# Patient Record
Sex: FEMALE | Race: WHITE | URBAN - METROPOLITAN AREA
[De-identification: names, ages, dates, MRNs, and addresses within clinical notes are randomized per-mention and may not be internally consistent; named-entity substitution may affect disease eponyms.]

---

## 2021-04-30 ENCOUNTER — HOSPITAL ENCOUNTER (INPATIENT)
Facility: CLINIC | Age: 21
LOS: 4 days | Discharge: HOME | DRG: 885 | End: 2021-05-04
Attending: PSYCHIATRY & NEUROLOGY | Admitting: PSYCHIATRY & NEUROLOGY
Payer: COMMERCIAL

## 2021-04-30 ENCOUNTER — TELEPHONE (OUTPATIENT)
Dept: BEHAVIORAL HEALTH | Facility: CLINIC | Age: 21
End: 2021-04-30

## 2021-04-30 ENCOUNTER — TRANSFERRED RECORDS (OUTPATIENT)
Dept: HEALTH INFORMATION MANAGEMENT | Facility: CLINIC | Age: 21
End: 2021-04-30

## 2021-04-30 DIAGNOSIS — F43.10 PTSD (POST-TRAUMATIC STRESS DISORDER): ICD-10-CM

## 2021-04-30 DIAGNOSIS — F32.2 MAJOR DEPRESSIVE DISORDER, SINGLE EPISODE, SEVERE WITHOUT PSYCHOSIS (H): Primary | ICD-10-CM

## 2021-04-30 DIAGNOSIS — F41.9 ANXIETY: ICD-10-CM

## 2021-04-30 PROBLEM — R45.851 SUICIDAL IDEATION: Status: ACTIVE | Noted: 2021-04-30

## 2021-04-30 PROCEDURE — 124N000002 HC R&B MH UMMC

## 2021-04-30 PROCEDURE — 99223 1ST HOSP IP/OBS HIGH 75: CPT | Mod: AI | Performed by: NURSE PRACTITIONER

## 2021-04-30 PROCEDURE — 99207 PR CONSULT E&M CHANGED TO SUBSEQUENT LEVEL: CPT | Performed by: NURSE PRACTITIONER

## 2021-04-30 PROCEDURE — 250N000013 HC RX MED GY IP 250 OP 250 PS 637: Performed by: NURSE PRACTITIONER

## 2021-04-30 PROCEDURE — 99232 SBSQ HOSP IP/OBS MODERATE 35: CPT | Performed by: NURSE PRACTITIONER

## 2021-04-30 RX ORDER — AMOXICILLIN 250 MG
1 CAPSULE ORAL 2 TIMES DAILY PRN
Status: DISCONTINUED | OUTPATIENT
Start: 2021-04-30 | End: 2021-05-04 | Stop reason: HOSPADM

## 2021-04-30 RX ORDER — PRAZOSIN HYDROCHLORIDE 1 MG/1
1 CAPSULE ORAL AT BEDTIME
Status: DISCONTINUED | OUTPATIENT
Start: 2021-04-30 | End: 2021-05-04 | Stop reason: HOSPADM

## 2021-04-30 RX ORDER — OLANZAPINE 5 MG/1
5-10 TABLET ORAL 3 TIMES DAILY PRN
Status: DISCONTINUED | OUTPATIENT
Start: 2021-04-30 | End: 2021-05-03

## 2021-04-30 RX ORDER — HYDROXYZINE HYDROCHLORIDE 25 MG/1
25-50 TABLET, FILM COATED ORAL EVERY 4 HOURS PRN
Status: DISCONTINUED | OUTPATIENT
Start: 2021-04-30 | End: 2021-05-04 | Stop reason: HOSPADM

## 2021-04-30 RX ORDER — TRAZODONE HYDROCHLORIDE 50 MG/1
50 TABLET, FILM COATED ORAL
Status: DISCONTINUED | OUTPATIENT
Start: 2021-04-30 | End: 2021-05-04 | Stop reason: HOSPADM

## 2021-04-30 RX ORDER — FOLIC ACID 1 MG/1
1 TABLET ORAL DAILY
Status: DISCONTINUED | OUTPATIENT
Start: 2021-05-01 | End: 2021-04-30

## 2021-04-30 RX ORDER — GABAPENTIN 100 MG/1
100 CAPSULE ORAL 3 TIMES DAILY
Status: DISCONTINUED | OUTPATIENT
Start: 2021-04-30 | End: 2021-05-04 | Stop reason: HOSPADM

## 2021-04-30 RX ORDER — LANOLIN ALCOHOL/MO/W.PET/CERES
100 CREAM (GRAM) TOPICAL DAILY
Status: DISCONTINUED | OUTPATIENT
Start: 2021-05-01 | End: 2021-04-30

## 2021-04-30 RX ORDER — MAGNESIUM HYDROXIDE/ALUMINUM HYDROXICE/SIMETHICONE 120; 1200; 1200 MG/30ML; MG/30ML; MG/30ML
30 SUSPENSION ORAL EVERY 4 HOURS PRN
Status: DISCONTINUED | OUTPATIENT
Start: 2021-04-30 | End: 2021-05-04 | Stop reason: HOSPADM

## 2021-04-30 RX ORDER — GABAPENTIN 100 MG/1
100-300 CAPSULE ORAL 3 TIMES DAILY PRN
Status: DISCONTINUED | OUTPATIENT
Start: 2021-04-30 | End: 2021-05-04 | Stop reason: HOSPADM

## 2021-04-30 RX ORDER — ATENOLOL 50 MG/1
50 TABLET ORAL DAILY PRN
Status: DISCONTINUED | OUTPATIENT
Start: 2021-04-30 | End: 2021-04-30

## 2021-04-30 RX ORDER — FLUOXETINE 10 MG/1
10 CAPSULE ORAL DAILY
Status: DISCONTINUED | OUTPATIENT
Start: 2021-04-30 | End: 2021-05-04 | Stop reason: HOSPADM

## 2021-04-30 RX ORDER — DIAZEPAM 5 MG
5-20 TABLET ORAL EVERY 30 MIN PRN
Status: DISCONTINUED | OUTPATIENT
Start: 2021-04-30 | End: 2021-04-30

## 2021-04-30 RX ORDER — ACETAMINOPHEN 325 MG/1
650 TABLET ORAL EVERY 4 HOURS PRN
Status: DISCONTINUED | OUTPATIENT
Start: 2021-04-30 | End: 2021-05-04 | Stop reason: HOSPADM

## 2021-04-30 RX ORDER — OLANZAPINE 10 MG/2ML
5-10 INJECTION, POWDER, FOR SOLUTION INTRAMUSCULAR 3 TIMES DAILY PRN
Status: DISCONTINUED | OUTPATIENT
Start: 2021-04-30 | End: 2021-05-03

## 2021-04-30 RX ORDER — MULTIPLE VITAMINS W/ MINERALS TAB 9MG-400MCG
1 TAB ORAL DAILY
Status: DISCONTINUED | OUTPATIENT
Start: 2021-05-01 | End: 2021-04-30

## 2021-04-30 RX ADMIN — FLUOXETINE 10 MG: 10 CAPSULE ORAL at 14:31

## 2021-04-30 RX ADMIN — GABAPENTIN 100 MG: 100 CAPSULE ORAL at 22:26

## 2021-04-30 RX ADMIN — PRAZOSIN HYDROCHLORIDE 1 MG: 1 CAPSULE ORAL at 22:26

## 2021-04-30 RX ADMIN — GABAPENTIN 100 MG: 100 CAPSULE ORAL at 14:31

## 2021-04-30 ASSESSMENT — ACTIVITIES OF DAILY LIVING (ADL)
FALL_HISTORY_WITHIN_LAST_SIX_MONTHS: NO
DIFFICULTY_COMMUNICATING: NO
DOING_ERRANDS_INDEPENDENTLY_DIFFICULTY: NO
CONCENTRATING,_REMEMBERING_OR_MAKING_DECISIONS_DIFFICULTY: NO
WALKING_OR_CLIMBING_STAIRS_DIFFICULTY: NO
DIFFICULTY_COMMUNICATING: NO
WEAR_GLASSES_OR_BLIND: NO
PATIENT_/_FAMILY_COMMUNICATION_STYLE: SPOKEN LANGUAGE (ENGLISH OR BILINGUAL)
HEARING_DIFFICULTY_OR_DEAF: NO
CONCENTRATING,_REMEMBERING_OR_MAKING_DECISIONS_DIFFICULTY: NO
HEARING_DIFFICULTY_OR_DEAF: NO
PATIENT_/_FAMILY_COMMUNICATION_STYLE: SPOKEN LANGUAGE (ENGLISH OR BILINGUAL)
WEAR_GLASSES_OR_BLIND: NO
WALKING_OR_CLIMBING_STAIRS_DIFFICULTY: NO
TOILETING_ISSUES: NO
DOING_ERRANDS_INDEPENDENTLY_DIFFICULTY: NO
DRESSING/BATHING_DIFFICULTY: NO
TOILETING_ISSUES: NO
FALL_HISTORY_WITHIN_LAST_SIX_MONTHS: NO
DRESSING/BATHING_DIFFICULTY: NO
DIFFICULTY_EATING/SWALLOWING: NO
DIFFICULTY_EATING/SWALLOWING: NO

## 2021-04-30 ASSESSMENT — MIFFLIN-ST. JEOR: SCORE: 1407.24
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
## 2021-04-30 NOTE — TELEPHONE ENCOUNTER
R:    Patient cleared and ready for behavioral bed placement: Yes     Provider called back at  7:13am and accepted for 4A  Pt placed in unit queue at 7:22am and unit called with disposition  [facility] Updated with Placement, Accepting physician and unit to call for nurse to nurse

## 2021-04-30 NOTE — CONSULTS
Internal Medicine Consult - Initial Visit       Munira Mazariegos MRN# [id number]   YOB: 2000 Age: 21 year old   Date of Admission: 4/30/2021  PCP: No primary care provider on file.  Date of Service: 4/30/2021    Referring Provider: Erick Dunlap MD  Reason for Consult: Medical co-management          Assessment and Recommendations:   Munira Mazariegos is a 21 year old female with a history of depression, anxiety, and polysubstance abuse admitted for worsening depression and SI.          # Depression, anxiety - Admitted w/ SI and severe depression.     - Management per Psychiatry     # Polysubstance abuse - In remission. Hx of benzo misuse per chart review.  - Continue nicotine replacement PRN     Medicine will sign off, no further recommendations at this time.  Please feel free to reconsult if patient's symptoms worsen or if new problems arise.  Thank you for the opportunity to care for this patient.     Jo Quarles, CNP, APRN  Internal Medicine [place] Hospitalist  [facility]  Pager [phone]           History of Present Illness:   History is obtained from the patient and medical record.     This patient is a 21 year old female with a history of depression, anxiety, and polysubstance abuse admitted for worsening depression and SI.        Internal Medicine service was asked to see patient for medical co-management and admission from OSH.  Munira is resting in bed.  She appears tired and wants to sleep.  Denies any chronic medical issues.  She denies chest pain, dyspnea, abdominal pain, fevers, chills, and dysuria.             Review of Systems:   A 10 point ROS was performed and negative unless otherwise noted in HPI.           Past Medical History:   Reviewed and updated in Epic.  Past Medical History:   Diagnosis Date     Anxiety      Depression              Past Surgical History:   Reviewed and updated in Epic.  No past surgical history on file.          Social History:    Reviewed and updated in Epic.  Social History     Socioeconomic History     Marital status: Single     Spouse name: Not on file     Number of children: Not on file     Years of education: Not on file     Highest education level: Not on file   Occupational History     Not on file   Social Needs     Financial resource strain: Not on file     Food insecurity     Worry: Not on file     Inability: Not on file     Transportation needs     Medical: Not on file     Non-medical: Not on file   Tobacco Use     Smoking status: Not on file   Substance and Sexual Activity     Alcohol use: Not on file     Drug use: Not on file     Sexual activity: Not on file   Lifestyle     Physical activity     Days per week: Not on file     Minutes per session: Not on file     Stress: Not on file   Relationships     Social connections     Talks on phone: Not on file     Gets together: Not on file     Attends Christianity service: Not on file     Active member of club or organization: Not on file     Attends meetings of clubs or organizations: Not on file     Relationship status: Not on file     Intimate partner violence     Fear of current or ex partner: Not on file     Emotionally abused: Not on file     Physically abused: Not on file     Forced sexual activity: Not on file   Other Topics Concern     Not on file   Social History Narrative     Not on file              Family History:   Reviewed and updated in Epic.  No family history on file.          Allergies:   No Known Allergies          Medications:     Current Facility-Administered Medications   Medication     acetaminophen (TYLENOL) tablet 650 mg     alum & mag hydroxide-simethicone (MAALOX) suspension 30 mL     [START ON 5/1/2021] ARIPiprazole (ABILIFY) half-tab 2 mg     FLUoxetine (PROzac) capsule 10 mg     gabapentin (NEURONTIN) capsule 100 mg     gabapentin (NEURONTIN) capsule 100-300 mg     hydrOXYzine (ATARAX) tablet 25-50 mg     nicotine (NICORETTE) gum 2-4 mg     OLANZapine  "(zyPREXA) tablet 5-10 mg    Or     OLANZapine (zyPREXA) injection 5-10 mg     prazosin (MINIPRESS) capsule 1 mg     senna-docusate (SENOKOT-S/PERICOLACE) 8.6-50 MG per tablet 1 tablet     traZODone (DESYREL) tablet 50 mg            Physical Exam:   Blood pressure 116/80, pulse 106, temperature 98  F (36.7  C), temperature source Oral, resp. rate 16, height 1.676 m (5' 6\"), weight 63.5 kg (140 lb 1.6 oz), SpO2 98 %.  Body mass index is 22.61 kg/m .    GENERAL: Alert and oriented x 3. Well nourished, well developed.  No acute distress.    HEENT: Normocephalic, atraumatic. Anicteric sclera. Mucous membranes moist.   CV: RRR. S1, S2. No murmurs appreciated.   RESPIRATORY: Effort normal on room air. Lungs CTAB with no wheezing, rales, or rhonchi.   GI: Abdomen soft and non distended, bowel sounds present x all 4 quadrants. No tenderness, rebound, or guarding.   NEUROLOGICAL: No focal deficits. Follows commands.  Strength equal in upper and lower extremities.   MUSCULOSKELETAL: No joint swelling or tenderness. Moves all extremities.   EXTREMITIES: No gross deformities. No peripheral edema.  SKIN: Grossly warm, dry, and intact. No jaundice. No rashes.             Data:   I personally reviewed the following studies:    ROUTINE IP LABS (Last four results)  CMP No lab results found in last 7 days.  CBC No lab results found in last 7 days.  INR No lab results found in last 7 days.      Unresulted Labs Ordered in the Past 30 Days of this Admission     No orders found from 3/31/2021 to 5/1/2021.             "
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
## 2021-04-30 NOTE — PROGRESS NOTES
04/30/21 1131   Patient Belongings   Did you bring any home meds/supplements to the hospital?  No   Patient Belongings locker   Patient Belongings Put in Hospital Secure Location (Security or Locker, etc.) other (see comments)   Belongings Search Yes   Clothing Search Yes   Second Staff Nata ZAMORA & Kaoyde PERALES       Unit Bin  - 2 t -shirts  - 1 grey zip up sweathshirt  - 1 dark grey sweatpants  - 1 black soft shell jacket  - 1 black scrunchie   - 1 iphone with case  - 1 iphone  cable with outlet cube  - 4 deb pins  - 1 apple watch  - 1 pack of cigaretters  - 1 green lighter  - 1 2 fl oz bottle of contact solution  - 1 contact lens case  - 1 pair of shoes    With Patient  - 1 Bra  - 1 pair of Underwear    Nothing in Security    A               Admission:  I am responsible for any personal items that are not sent to the safe or pharmacy.  [facility] is not responsible for loss, theft or damage of any property in my possession.    Signature:  _________________________________ Date: _______  Time: _____                                              Staff Signature:  ____________________________ Date: ________  Time: _____      2nd Staff person, if patient is unable/unwilling to sign:    Signature: ________________________________ Date: ________  Time: _____     Discharge:  [facility] has returned all of my personal belongings:    Signature: _________________________________ Date: ________  Time: _____                                          Staff Signature:  ____________________________ Date: ________  Time: _____

## 2021-04-30 NOTE — PLAN OF CARE
"ADMIT:              S: This is a 20 y/o female presented to the [facility] ED with depression and SI & transported from there ED to our unit 4a arriving at 1100 today.     B: pt has Hx MDD, PTSD, anxiety, insomnia, polysubstance abuse.  SI w/a plan to drive in front of a semi truck and thoughts of overdosing on all of her medications.  Stopped taking her medications 6 months ago because she felt she was doing well.  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 Pt admitted to drinking alcohol last night which was a .2. Hx of suicide attempt via overdose at 17 yo.  She lives alone.   PROBATION: She is on probation for the past year due to a DWI.     A: 72 hr hold 0545 this morning.  No acute medical concerns. Negative for COVID.  Drug screen negative.  Ethanol at 0245 was 202  CBC within normal range  Protein total 8.3  Acetaminophen 0  Salicylate 2.1     R: she has contracted for safety and has settled in well on the unit at this time.                "

## 2021-04-30 NOTE — TELEPHONE ENCOUNTER
S: 20 y/o female presented to the [facility] ED with depression and SI.    B: Hx MDD, PTSD, anxiety, insomnia, polysubstance abuse.  SI w/a plan to drive in front of a semi truck and thoughts of overdosing on all of her medications.  Stopped taking her medications 6 months ago because she felt she was doing well.  Multiple stressors: death of a friend and stressful work environment as a CNA.  Pt admitted to drinking alcohol last night.  Hx of suicide attempt via overdose.    A: 72 hr hold  No acute medical concerns.  Negative for COVID.  Drug screen negative.  Ethanol at 0245 202  CBC within normal range  Protein total 8.3  Acetaminophen 0  Salicylate 2.1    R: 0703 On-Call paged.  1246 Case has been passed to days to track.

## 2021-05-01 PROCEDURE — 250N000013 HC RX MED GY IP 250 OP 250 PS 637: Performed by: NURSE PRACTITIONER

## 2021-05-01 PROCEDURE — 124N000002 HC R&B MH UMMC

## 2021-05-01 RX ADMIN — GABAPENTIN 100 MG: 100 CAPSULE ORAL at 21:45

## 2021-05-01 RX ADMIN — GABAPENTIN 100 MG: 100 CAPSULE ORAL at 09:27

## 2021-05-01 RX ADMIN — HYDROXYZINE HYDROCHLORIDE 50 MG: 25 TABLET, FILM COATED ORAL at 00:21

## 2021-05-01 RX ADMIN — FLUOXETINE 10 MG: 10 CAPSULE ORAL at 09:27

## 2021-05-01 RX ADMIN — Medication 2 MG: at 09:27

## 2021-05-01 RX ADMIN — GABAPENTIN 100 MG: 100 CAPSULE ORAL at 14:49

## 2021-05-01 RX ADMIN — PRAZOSIN HYDROCHLORIDE 1 MG: 1 CAPSULE ORAL at 21:46

## 2021-05-01 NOTE — PLAN OF CARE
Pt isolated and slept the whole evening. She was medication compliant and makes her needs known. Pt did eat supper and went back to sleep. Pt did not want to wake up to answer any questions. Will continue to monitor.

## 2021-05-01 NOTE — PHARMACY-ADMISSION MEDICATION HISTORY
Admission Medication History status for the 4/30/2021 admission is complete.  See EPIC admission navigator for Prior to Admission medications.    Medication history sources:  patient and superscripts      Medication history source reliability: Good    Medication adherence:  N/A    Changes made to PTA medication list (reason)  Added: N/A  Deleted: N/A  Changed: N/A    Additional medication history information (including reliability of information, actions taken by pharmacist): None    Time spent in this activity: 10 minutes     Medication history completed by: Naa Mccullough, Pharm.D    Prior to Admission medications    Not on File

## 2021-05-01 NOTE — PROGRESS NOTES
"0020--Patient c/o difficulty sleeping. Writer offered Trazodone and patient decline ans said \"I cant do Trazodone.\" Patient offered and received hydroxyzine 50 mg at this time. Patient returned to room and slept through the rest of the night.   "

## 2021-05-01 NOTE — PLAN OF CARE
Problem: Depressive Symptoms  Goal: Social and Therapeutic (Depression)  Description: Signs and symptoms of listed problems will be absent or manageable.  Outcome: Improving   Pt has to awoken as she presents as tired. She ate breakfast and cooperative with meds. She remains sad in affect but has contracted for safety. Will attempt to engage in conversation.

## 2021-05-02 PROCEDURE — 124N000002 HC R&B MH UMMC

## 2021-05-02 PROCEDURE — 250N000013 HC RX MED GY IP 250 OP 250 PS 637: Performed by: NURSE PRACTITIONER

## 2021-05-02 RX ADMIN — Medication 2 MG: at 08:50

## 2021-05-02 RX ADMIN — PRAZOSIN HYDROCHLORIDE 1 MG: 1 CAPSULE ORAL at 21:07

## 2021-05-02 RX ADMIN — HYDROXYZINE HYDROCHLORIDE 50 MG: 25 TABLET, FILM COATED ORAL at 22:58

## 2021-05-02 RX ADMIN — GABAPENTIN 100 MG: 100 CAPSULE ORAL at 21:07

## 2021-05-02 RX ADMIN — FLUOXETINE 10 MG: 10 CAPSULE ORAL at 08:50

## 2021-05-02 RX ADMIN — GABAPENTIN 100 MG: 100 CAPSULE ORAL at 14:24

## 2021-05-02 RX ADMIN — GABAPENTIN 100 MG: 100 CAPSULE ORAL at 08:50

## 2021-05-02 ASSESSMENT — MIFFLIN-ST. JEOR: SCORE: 1398.62

## 2021-05-02 ASSESSMENT — ACTIVITIES OF DAILY LIVING (ADL)
HYGIENE/GROOMING: HANDWASHING;INDEPENDENT;SHOWER
LAUNDRY: WITH SUPERVISION
DRESS: SCRUBS (BEHAVIORAL HEALTH)
ORAL_HYGIENE: INDEPENDENT

## 2021-05-02 NOTE — PLAN OF CARE
Problem: Depressive Symptoms  Goal: Depressive Symptoms  Description: Signs and symptoms of listed problems will be absent or manageable.  Outcome: No Change   Pt endorses depression but denies SI. She is flat in affect but states she is doing OK. She sleeps in but does eat breakfast and just needs to discuss more of her issues that would apply to her situation. She agreed to open up.  Will continue to assess.

## 2021-05-02 NOTE — PROGRESS NOTES
Patient was awake once to get water and returned to bed. Patient slept through the rest of the night (7 hrs). No safety concerns at this time.

## 2021-05-02 NOTE — PLAN OF CARE
Problem: Depressive Symptoms  Goal: Depressive Symptoms  Description: Signs and symptoms of listed problems will be absent or manageable.  Outcome: Declining   48 Hour Nursing Assessment     Patient evaluation continues. Is agreeable to check in.  Patient stays isolated to her room. Assessed mood, anxiety, thoughts and behavior. Patient denies SI, SIB and auditory or visual hallucinations.   Pt reports staying in her room to read a book and nap.   Reports good sleep and good appetite.     Will continue to encourage participation in groups and developing healthy coping skills. Will continue to assess.

## 2021-05-03 PROCEDURE — 99232 SBSQ HOSP IP/OBS MODERATE 35: CPT | Performed by: CLINICAL NURSE SPECIALIST

## 2021-05-03 PROCEDURE — 124N000002 HC R&B MH UMMC

## 2021-05-03 PROCEDURE — 250N000013 HC RX MED GY IP 250 OP 250 PS 637: Performed by: NURSE PRACTITIONER

## 2021-05-03 PROCEDURE — 90853 GROUP PSYCHOTHERAPY: CPT

## 2021-05-03 PROCEDURE — G0177 OPPS/PHP; TRAIN & EDUC SERV: HCPCS

## 2021-05-03 RX ORDER — GABAPENTIN 100 MG/1
100 CAPSULE ORAL 3 TIMES DAILY
Qty: 90 CAPSULE | Refills: 1 | Status: SHIPPED | OUTPATIENT
Start: 2021-05-03

## 2021-05-03 RX ORDER — ARIPIPRAZOLE 2 MG/1
2 TABLET ORAL DAILY
Qty: 30 TABLET | Refills: 1 | Status: SHIPPED | OUTPATIENT
Start: 2021-05-04

## 2021-05-03 RX ORDER — FLUOXETINE 10 MG/1
10 CAPSULE ORAL DAILY
Qty: 30 CAPSULE | Refills: 1 | Status: SHIPPED | OUTPATIENT
Start: 2021-05-04

## 2021-05-03 RX ORDER — PRAZOSIN HYDROCHLORIDE 1 MG/1
1 CAPSULE ORAL AT BEDTIME
Qty: 30 CAPSULE | Refills: 1 | Status: SHIPPED | OUTPATIENT
Start: 2021-05-03

## 2021-05-03 RX ADMIN — GABAPENTIN 100 MG: 100 CAPSULE ORAL at 21:51

## 2021-05-03 RX ADMIN — GABAPENTIN 100 MG: 100 CAPSULE ORAL at 13:19

## 2021-05-03 RX ADMIN — FLUOXETINE 10 MG: 10 CAPSULE ORAL at 10:55

## 2021-05-03 RX ADMIN — PRAZOSIN HYDROCHLORIDE 1 MG: 1 CAPSULE ORAL at 21:51

## 2021-05-03 RX ADMIN — Medication 2 MG: at 10:54

## 2021-05-03 RX ADMIN — GABAPENTIN 100 MG: 100 CAPSULE ORAL at 10:55

## 2021-05-03 NOTE — PLAN OF CARE
Work Completed: CTC introduced self and role to patient. CTC discussed discharge planning. Pt reported she was interested in doing therapy and wants to look into DBT. Pt had no other questions.     CTC checked in with pt re: after care follow up. Pt was ok with CTC making her outpatient appointment. Pt provided CTC with available days and times for scheduling. CTC completed pt's AVS as she is discharging tomorrow afternoon.    Discharge plan or goal: Return home with outpatient services.                Barriers to discharge: Medication management.
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
## 2021-05-03 NOTE — PLAN OF CARE
INITIAL OT NOTE  Problem: OT General Care Plan  Goal: OT Goal 1  Description: Pt will practice using >2 coping strategies to manage stress and reduce symptoms to demonstrate increased readiness for discharge.     Pt actively participated in occupational therapy clinic. Purpose of structured group: exploration/development of positive coping skills, engagement in creative expression and clinical observation of social, cognitive, and kinesthetic performance skills. Pt response: chosen activity: bracelet making. Pt initially quite and focused on task. Pt verbalized assistance as needed and appeared more comfortable interacting with peers/staff as group went on. Calm, cooperative and engaged.    Pt actively participated in a structured occupational therapy group that focused on recognizing and understanding how sensory integration can be used as a self-regulating tool. Pt demonstrated active listening but did not contribute to the group brainstorm of sources of sensory integration that they would like to or typically use as a coping skill. Pt engaged in a hands-on task involving creating their own weighted eye pillow. Pt was educated on safe and proper use of the pillow as a way to promote relaxation and rest.   Outcome: No Change

## 2021-05-03 NOTE — PROGRESS NOTES
"[facility], [place]   Psychiatric Progress Note        Interim History:   The patient's care was discussed with the treatment team during the daily team meeting and/or staff's chart notes were reviewed.  Staff report patient is visible in the milieu and has been involved in groups.     Psychiatric symptoms and interventions:   Upon interview with patient, she reports an improved mood. Patient reports she stopped taking her meds for about 2 years. She wanted to \"feel normal\". Patient reports she realizes for now she needs medics in order to manage her depression and anxiety. Provider discussed adding therapy to her self care to develop her skills to more effectively manage her mental health symptoms.     Patient will discharges on Tuesday. Meds ordered. Mother to PU.          Medications:       ARIPiprazole  2 mg Oral Daily     FLUoxetine  10 mg Oral Daily     gabapentin  100 mg Oral TID     prazosin  1 mg Oral At Bedtime          Allergies:   No Known Allergies       Labs:   No results found for this or any previous visit (from the past 24 hour(s)).       Psychiatric Examination:     /70   Pulse 67   Temp 98  F (36.7  C) (Oral)   Resp 16   Ht 1.676 m (5' 6\")   Wt 62.7 kg (138 lb 3.2 oz)   SpO2 98%   BMI 22.31 kg/m    Weight is 138 lbs 3.2 oz  Body mass index is 22.31 kg/m .  Orthostatic Vitals       Most Recent      Standing Orthostatic /70 05/02 0900    Standing Orthostatic Pulse (bpm) 112 05/02 0900            Appearance: awake, alert and adequately groomed  Attitude:  cooperative  Eye Contact:  good  Mood:  better  Affect:  appropriate and in normal range  Speech:  clear, coherent  Psychomotor Behavior:  no evidence of tardive dyskinesia, dystonia, or tics  Throught Process:  logical, linear and goal oriented  Associations:  no loose associations  Thought Content:  no evidence of suicidal ideation or homicidal ideation  Insight:  good  Judgement:  intact  Oriented " to:  time, person, and place  Attention Span and Concentration:  intact  Recent and Remote Memory:  intact    Clinical Global Impressions  First:  Considering your total clinical experience with this particular patient population, how severe are the patient's symptoms at this time?: 7 (04/30/21 1145)  Compared to the patient's condition at the START of treatment, this patient's condition is: 7 (04/30/21 1145)  Most recent:  Considering your total clinical experience with this particular patient population, how severe are the patient's symptoms at this time?: 7 (04/30/21 1145)  Compared to the patient's condition at the START of treatment, this patient's condition is: 7 (04/30/21 1145)         Precautions:     Behavioral Orders   Procedures     Code 1 - Restrict to Unit     Routine Programming     As clinically indicated     Status 15     Every 15 minutes.     Suicide precautions     Patients on Suicide Precautions should have a Combination Diet ordered that includes a Diet selection(s) AND a Behavioral Tray selection for Safe Tray - with utensils, or Safe Tray - NO utensils            DIagnoses:   1.  Major depressive disorder, recurrent, severe, with anxious distress, without psychosis  2.  Generalized anxiety disorder  3.  PTSD  4.  ADHD, per history  5.  Borderline personality disorder  6.  Alcohol use disorder, mild  7.  Benzodiazepine dependency in complete remission  8.  Tobacco use disorder         Plan:     Legal status: Voluntary     Medication management:   Abilify 2 mg   Prozac 10 mg   Gabapentin 100 mg TID   Prazosin 1 mg     Medical: No acute issues    Behavioral/pscyology/social:   Encouraged patient to attend therapeutic hospital programming as tolerated    Disposition:   Lionel will discharge to home on Tuesday. with therapy.   Meds ordered.

## 2021-05-03 NOTE — PLAN OF CARE
"Pt was calm and cooperative this morning and afternoon. Pt attended groups and was medication compliant. She ate her breakfast and lunch. Makes her needs known. Writer checked in with pt this afternoon and asked if she was having any SI and she stated \"no.\" Pt also denies SIB, AH, VH, and HI. Pt states to writer \"guess what? I get to go home tomorrow!\"   "

## 2021-05-03 NOTE — DISCHARGE INSTRUCTIONS
Behavioral Discharge Planning and Instructions    Summary: You were admitted on 4/30/2021  due to Depression and Suicidal Ideations.  You were treated by Deb Naegele, APRN and discharged on 05/04/2021 from [facility] Home    Main Diagnosis:   1.  Major depressive disorder, recurrent, severe, with anxious distress, without psychosis  2.  Generalized anxiety disorder  3.  PTSD  4.  ADHD, per history  5.  Borderline personality disorder  6.  Alcohol use disorder, mild  7.  Benzodiazepine dependency in complete remission  8.  Tobacco use disorder    Health Care Follow-up:   Individual Therapy Intake Appointment:   Date/Time: Monday, May 10th @ 3:00pm (2 hour- IN PERSON APPT)  Provider: Yuridia Serrano  Address: [facility]: [address] [place] [id number]   Phone: [phone]   Fax: [phone]    Psychiatry Appointment:   Date/Time: Monday June 7th @ 1:45 (you will meet with the nurse first and then see your provider via telehelath)  Provider: Dr. Carlitos Slade MD  Address: [facility]: [address] [place] [id number]   Phone: [phone]   Fax: [phone]    Attend all scheduled appointments with your outpatient providers. Call at least 24 hours in advance if you need to reschedule an appointment to ensure continued access to your outpatient providers.     Major Treatments, Procedures and Findings:  You were provided with: a psychiatric assessment, assessed for medical stability, medication evaluation and/or management and group therapy    Symptoms to Report: feeling more aggressive, increased confusion, losing more sleep, mood getting worse or thoughts of suicide    Early warning signs can include: increased depression or anxiety sleep disturbances increased thoughts or behaviors of suicide or self-harm  increased unusual thinking, such as paranoia or hearing voices    Safety and Wellness:  Take all medicines as directed.  Make no changes unless your doctor suggests them.      Follow  "treatment recommendations.  Refrain from alcohol and non-prescribed drugs.  Ask your support system to help you reduce your access to items that could harm yourself or others. If there is a concern for safety, call 911.    Resources:   Crisis Intervention: 536.945.5567 or 661-077-0342 (TTY: 713.225.1266).  Call anytime for help.  National [place] on Mental Illness (www.mn.fernanda.org): 432.220.7915 or 791-365-4533.  Alcoholics Anonymous (www.alcoholics-anonymous.org): Check your phone book for your local chapter.  National Suicide Prevention Line (www.mentalCupid-Labsmn.org): 404-636-YREQ (9459)  Self- Management and Recovery Training., SMART-- Toll free: 894.341.3924  Versus.Close.io  Text 4 Life: txt \"LIFE\" to 63513 for immediate support and crisis intervention  Crisis text line: Text \"[place]\" to 625818. Free, confidential, 24/7.  [facility] 24-Hour Mental Health Crisis Line - 4-389-765-8906 24 Hours a Day - 7 Days a Week at no cost to the caller    General Medication Instructions:   See your medication sheet(s) for instructions.   Take all medicines as directed.  Make no changes unless your doctor suggests them.   Go to all your doctor visits.  Be sure to have all your required lab tests. This way, your medicines can be refilled on time.  Do not use any drugs not prescribed by your doctor.  Avoid alcohol.    Advance Directives:   Scanned document on file with [facility]? No scanned doc  Is document scanned? No. Copy Requested.  Honoring Choices Your Rights Handout: Informed and given  Was more information offered? Pt declined    The Treatment team has appreciated the opportunity to work with you. If you have any questions or concerns about your recent admission, you can contact the unit which can receive your call 24 hours a day, 7 days a week. They will be able to get in touch with a Provider if needed. The unit number is [phone].        "

## 2021-05-04 VITALS
BODY MASS INDEX: 22.5 KG/M2 | SYSTOLIC BLOOD PRESSURE: 131 MMHG | OXYGEN SATURATION: 99 % | HEART RATE: 68 BPM | TEMPERATURE: 97.7 F | HEIGHT: 66 IN | WEIGHT: 140 LBS | DIASTOLIC BLOOD PRESSURE: 96 MMHG | RESPIRATION RATE: 16 BRPM

## 2021-05-04 PROCEDURE — 99239 HOSP IP/OBS DSCHRG MGMT >30: CPT | Performed by: CLINICAL NURSE SPECIALIST

## 2021-05-04 PROCEDURE — 250N000013 HC RX MED GY IP 250 OP 250 PS 637: Performed by: NURSE PRACTITIONER

## 2021-05-04 RX ADMIN — GABAPENTIN 100 MG: 100 CAPSULE ORAL at 08:03

## 2021-05-04 RX ADMIN — FLUOXETINE 10 MG: 10 CAPSULE ORAL at 08:03

## 2021-05-04 RX ADMIN — Medication 2 MG: at 08:04

## 2021-05-04 ASSESSMENT — MIFFLIN-ST. JEOR: SCORE: 1406.79

## 2021-05-04 NOTE — PROGRESS NOTES
" 05/3/21 1800   Groups   Details    (Pyschotherapy)         Number of patients attending the group:  4  Group Length:  1 Hours     Group Therapy Type:      Summary of Group / Topics Discussed:        The  Psychotherapy group goal is to promote insight to positive choice and change. Group processing is within a supportive and safe environment. Patients will process emotions using verbal group and expressive psychotherapy interventions including visual art/writing interventions.     Group interventions support patients by: Creative expression and the topic of empowerment     Modalities to reach these goals include: Art Therapy and process      Subjective -patient report of mood today- \" great - going home tomorrow\"     Objective/ Intervention- Goal of group and Therapeutic modality utilized- creative expression and verbal processing group     Group Response- engaged     Patient Response-     Pt was  engaged, pleasant and cooperative. 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 She made two drawings, a johny flower and a peace sign. Affect was bright.    Alton Emmanuel, LATASHA, ATR-BC          "

## 2021-05-04 NOTE — PLAN OF CARE
DISCHARGE:  This RN and pt have reviewed all meds and aftercare plan. All belongings returned. Pt denies SI , anxiety or depression at this time. Pt bright and smiling upon DC.  Mom picked up.

## 2021-05-04 NOTE — PROGRESS NOTES
05/04/21 0600   Sleep/Rest/Relaxation   Sleep/Rest/Relaxation (WDL) WDL   Sleep/Rest/Relaxation appears asleep   Night Time # Hours 6 hours     Focus: Shift summary    Data: Patient slept 6 hours last night. No interventions needed. Respirations even and unlabored on status 15 checks. Will continue to monitor and report to oncoming staff.    Response: Report sleep hours to day shift. Continue to monitor patient and provide therapeutic interventions as necessary.
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
## 2021-05-04 NOTE — PLAN OF CARE
"  Problem: Suicide Risk  Goal: Absence of Self-Harm  Outcome: Improving   48 Hour Nursing Assessment     Patient evaluation continues. Is agreeable to check in.  Patient brightens upon approach. Assessed mood, anxiety, thoughts and behavior. Patient denies SI, SIB and auditory or visual hallucinations.   Pt is anxious about discharge but happy and \"feeling ready to go home tomorrow.\"  Pt is requesting a  letter to bring to work after discharge,on the letter, she would like to have it noted that she will return to work on \"THURSDAY\"  that way she's taking a break and not going back to work right away.    Pt is compliant with medications, denies side effects.  Attends group.  Reports good sleep and good appetite.    Will continue to encourage participation in groups and developing healthy coping skills. Will continue to assess.              "